# Patient Record
Sex: FEMALE | Race: WHITE | Employment: FULL TIME | URBAN - METROPOLITAN AREA
[De-identification: names, ages, dates, MRNs, and addresses within clinical notes are randomized per-mention and may not be internally consistent; named-entity substitution may affect disease eponyms.]

---

## 2021-12-23 ENCOUNTER — DOCUMENTATION ONLY (OUTPATIENT)
Dept: WOUND CARE | Age: 41
End: 2021-12-23

## 2021-12-23 NOTE — PROGRESS NOTES
LVM for patient to call [facility] to [facility] new patient appointment for wound care.  Refer'd by Dr. Shiirn Kyle from [address] [facility]

## 2022-01-24 ENCOUNTER — HOSPITAL ENCOUNTER (OUTPATIENT)
Dept: WOUND CARE | Age: 42
Discharge: HOME | End: 2022-01-24

## 2022-01-24 NOTE — DISCHARGE INSTRUCTIONS
Discharge Instructions for  [facility]  [address] [place], [address] [facility] [address]  Telephone: [phone] [address] [facility] Information: Should you experience any significant changes in your wound(s) or have questions about your wound care, please contact the [facility] Main at [address] [place] [address] Street 8:00 am - 4:30. If you need help with your wound outside these hours and cannot wait until we are again available, contact your PCP or go to the hospital emergency room. NAME:  Tai Figueroa  YOB: 1980  DATE:  1/24/2022    : Braden Medel     [] Wound and dressing supply provider: {DME provider:34427}  [] Home Healthcare: {Home Health :42190}    Wound Cleansing:   Do not scrub or use excessive force. Cleanse wound prior to applying a clean dressing with:  [] Normal Saline   [] Keep Wound Dry in Shower - may purchase a cast cover at local pharmacy     [] Cleanse wound with Mild Soap & Water    [] May Shower at Discharge: remove dressing 1st, redress wound right after with a new dressing  [] Do not shower  [] cleanse with baby shampoo lather leave 2-3 then rinse with water    Topical Treatments:  Do not apply lotions, creams, or ointments to wound bed unless directed.    [] Apply moisturizing lotion {kimble lotions :43364} to skin surrounding the wound prior to dressing change.  [] Bactroban/Mupirocin  [] Gentamicin ointment    [] Gentian violet to wound bed and periwound  [] Other:     Dressings:                   Wound Location ***     Apply Primary Dressing:      []        Pack wound loosely with:                                                   [] Iodoform       [] Plain Packing       [] Mesalt        [] Other:    Cover and Secure with:  [] Gauze [] ABD [] exudry     [] Betito [] Kerlix [] Mepilex Border  [] Ace Wrap [] Roll Tape   [] Other:      Change dressing:   [] Daily      [] Every Other Day   [] Three times per week  [] Once a week   [] Do Not Change Dressing     [] Other:    Pressure Relief:  [] When sitting, shift position or do seat lifts every 15 minutes.  [] Wheelchair cushion   [] Specialty Bed/Mattress  [] Turn every 2 hours when in bed. Avoid position directing pressure on wound site. Off-Loading:   [] Off-loading when [] walking  [] in bed [] sitting     Edema Control: Every morning immediately when getting up should be applied to affected leg(s) from mid foot to knee making sure to cover the heel. Remove every night before going to bed if desired. Apply: [] Compression Stocking      []Right Leg           []Left Leg   [] Tubigrip {tubigrip:05615}   [] Right Leg Single Layer  [] Right Leg Double Layer                              [] Left Leg Single Layer [] Left Leg Double Layer      Compression: Do not get leg(s) with wrap wet. If wraps become too tight call the center or completely remove the wrap. Apply: [] Three Layer Compression Wrap  []RightLeg []Left Leg  [] Four layer Compression Wrap      []RightLeg []Left Leg   []  Unna's boot                                  [] Right Leg   [] Left Leg       [] Elevate leg(s) above the level of the heart when sitting. [] Avoid prolonged standing in one place. Dietary:  [] Diet as tolerated [] Diabetic Diet   [] Increase Protein: examples (Meat, cheese, eggs, greek yogurt, fish, nuts)   [] Elpidio Therapeutic Nutrition Powder  [] Other:  [] Dial a Dietician : Call [facility] at [phone] enter code [phone]) when prompted. M-F 9am-5pm EST. Return Appointment:  [] Nurse Visit at wound center in *** days   [] Return Appointment: With {lamin SPARKS list :98971}  [] Ordered tests:     Electronically signed on 1/24/2022 at 7:54 AM     PLEASE NOTE: IF YOU ARE UNABLE TO [address], CONTINUE TO USE  [address] UNTIL YOU ARE ABLE TO [facility]. IT IS MOST IMPORTANT TO KEEP THE WOUND COVERED AT ALL TIMES.      Physician Signature:_______________________  Dr. Chari Nunes

## 2022-05-19 ENCOUNTER — TRANSCRIBE ORDER (OUTPATIENT)
Dept: SCHEDULING | Age: 42
End: 2022-05-19

## 2022-05-19 DIAGNOSIS — Z13.820 SCREENING FOR OSTEOPOROSIS: Primary | ICD-10-CM

## 2022-06-03 ENCOUNTER — HOSPITAL ENCOUNTER (OUTPATIENT)
Dept: MAMMOGRAPHY | Age: 42
Discharge: HOME | End: 2022-06-03
Attending: INTERNAL MEDICINE
Payer: COMMERCIAL

## 2022-06-03 DIAGNOSIS — Z13.820 SCREENING FOR OSTEOPOROSIS: ICD-10-CM

## 2022-06-03 PROCEDURE — 77080 DXA BONE DENSITY AXIAL: CPT

## 2023-11-02 ENCOUNTER — APPOINTMENT (OUTPATIENT)
Facility: HOSPITAL | Age: 43
DRG: 690 | End: 2023-11-02

## 2023-11-02 ENCOUNTER — HOSPITAL ENCOUNTER (INPATIENT)
Facility: HOSPITAL | Age: 43
LOS: 4 days | Discharge: HOME | DRG: 690 | End: 2023-11-06
Attending: STUDENT IN AN ORGANIZED HEALTH CARE EDUCATION/TRAINING PROGRAM | Admitting: INTERNAL MEDICINE

## 2023-11-02 DIAGNOSIS — A41.9 SEVERE SEPSIS (HCC): ICD-10-CM

## 2023-11-02 DIAGNOSIS — R65.20 SEVERE SEPSIS (HCC): ICD-10-CM

## 2023-11-02 DIAGNOSIS — R50.81 FEVER IN OTHER DISEASES: Primary | ICD-10-CM

## 2023-11-02 LAB
ALBUMIN SERPL-MCNC: 2.7 G/DL (ref 3.5–5)
ALBUMIN/GLOB SERPL: 0.6 (ref 1.1–2.2)
ALP SERPL-CCNC: 75 U/L (ref 45–117)
ALT SERPL-CCNC: 31 U/L (ref 12–78)
ANION GAP SERPL CALC-SCNC: 10 MMOL/L (ref 5–15)
APPEARANCE UR: CLEAR
AST SERPL-CCNC: 40 U/L (ref 15–37)
B PERT DNA SPEC QL NAA+PROBE: NOT DETECTED
BACTERIA URNS QL MICRO: ABNORMAL /HPF
BASOPHILS # BLD: 0 K/UL (ref 0–0.1)
BASOPHILS NFR BLD: 0 % (ref 0–1)
BILIRUB SERPL-MCNC: 0.3 MG/DL (ref 0.2–1)
BILIRUB UR QL: NEGATIVE
BORDETELLA PARAPERTUSSIS BY PCR: NOT DETECTED
BUN SERPL-MCNC: 9 MG/DL (ref 6–20)
BUN/CREAT SERPL: 7 (ref 12–20)
C PNEUM DNA SPEC QL NAA+PROBE: NOT DETECTED
CALCIUM SERPL-MCNC: 9 MG/DL (ref 8.5–10.1)
CHLORIDE SERPL-SCNC: 96 MMOL/L (ref 97–108)
CO2 SERPL-SCNC: 26 MMOL/L (ref 21–32)
COLOR UR: ABNORMAL
COMMENT:: NORMAL
CREAT SERPL-MCNC: 1.21 MG/DL (ref 0.55–1.02)
DIFFERENTIAL METHOD BLD: ABNORMAL
EOSINOPHIL # BLD: 0 K/UL (ref 0–0.4)
EOSINOPHIL NFR BLD: 0 % (ref 0–7)
EPITH CASTS URNS QL MICRO: ABNORMAL /LPF
ERYTHROCYTE [DISTWIDTH] IN BLOOD BY AUTOMATED COUNT: 13.4 % (ref 11.5–14.5)
FLUAV SUBTYP SPEC NAA+PROBE: NOT DETECTED
FLUBV RNA SPEC QL NAA+PROBE: NOT DETECTED
GLOBULIN SER CALC-MCNC: 4.8 G/DL (ref 2–4)
GLUCOSE BLD STRIP.AUTO-MCNC: 202 MG/DL (ref 65–117)
GLUCOSE SERPL-MCNC: 476 MG/DL (ref 65–100)
GLUCOSE UR STRIP.AUTO-MCNC: 250 MG/DL
HADV DNA SPEC QL NAA+PROBE: NOT DETECTED
HCG UR QL: NEGATIVE
HCOV 229E RNA SPEC QL NAA+PROBE: NOT DETECTED
HCOV HKU1 RNA SPEC QL NAA+PROBE: NOT DETECTED
HCOV NL63 RNA SPEC QL NAA+PROBE: NOT DETECTED
HCOV OC43 RNA SPEC QL NAA+PROBE: NOT DETECTED
HCT VFR BLD AUTO: 43.2 % (ref 35–47)
HGB BLD-MCNC: 14.9 G/DL (ref 11.5–16)
HGB UR QL STRIP: ABNORMAL
HMPV RNA SPEC QL NAA+PROBE: NOT DETECTED
HPIV1 RNA SPEC QL NAA+PROBE: NOT DETECTED
HPIV2 RNA SPEC QL NAA+PROBE: NOT DETECTED
HPIV3 RNA SPEC QL NAA+PROBE: NOT DETECTED
HPIV4 RNA SPEC QL NAA+PROBE: NOT DETECTED
IMM GRANULOCYTES # BLD AUTO: 0.1 K/UL (ref 0–0.04)
IMM GRANULOCYTES NFR BLD AUTO: 1 % (ref 0–0.5)
KETONES UR QL STRIP.AUTO: NEGATIVE MG/DL
LACTATE BLD-SCNC: 4.48 MMOL/L (ref 0.4–2)
LACTATE BLD-SCNC: 5.72 MMOL/L (ref 0.4–2)
LEUKOCYTE ESTERASE UR QL STRIP.AUTO: NEGATIVE
LIPASE SERPL-CCNC: 24 U/L (ref 13–75)
LYMPHOCYTES # BLD: 0.9 K/UL (ref 0.8–3.5)
LYMPHOCYTES NFR BLD: 9 % (ref 12–49)
M PNEUMO DNA SPEC QL NAA+PROBE: NOT DETECTED
MCH RBC QN AUTO: 31.3 PG (ref 26–34)
MCHC RBC AUTO-ENTMCNC: 34.5 G/DL (ref 30–36.5)
MCV RBC AUTO: 90.8 FL (ref 80–99)
MONOCYTES # BLD: 0.5 K/UL (ref 0–1)
MONOCYTES NFR BLD: 5 % (ref 5–13)
NEUTS SEG # BLD: 8.9 K/UL (ref 1.8–8)
NEUTS SEG NFR BLD: 85 % (ref 32–75)
NITRITE UR QL STRIP.AUTO: NEGATIVE
NRBC # BLD: 0 K/UL (ref 0–0.01)
NRBC BLD-RTO: 0 PER 100 WBC
PH UR STRIP: 6 (ref 5–8)
PLATELET # BLD AUTO: 205 K/UL (ref 150–400)
PMV BLD AUTO: 10.2 FL (ref 8.9–12.9)
POTASSIUM SERPL-SCNC: 4.2 MMOL/L (ref 3.5–5.1)
PROCALCITONIN SERPL-MCNC: 0.51 NG/ML
PROT SERPL-MCNC: 7.5 G/DL (ref 6.4–8.2)
PROT UR STRIP-MCNC: NEGATIVE MG/DL
RBC # BLD AUTO: 4.76 M/UL (ref 3.8–5.2)
RBC #/AREA URNS HPF: ABNORMAL /HPF (ref 0–5)
RSV RNA SPEC QL NAA+PROBE: NOT DETECTED
RV+EV RNA SPEC QL NAA+PROBE: NOT DETECTED
SARS-COV-2 RNA RESP QL NAA+PROBE: NOT DETECTED
SERVICE CMNT-IMP: ABNORMAL
SODIUM SERPL-SCNC: 132 MMOL/L (ref 136–145)
SP GR UR REFRACTOMETRY: >1.03 (ref 1–1.03)
SPECIMEN HOLD: NORMAL
TROPONIN I SERPL HS-MCNC: 6 NG/L (ref 0–51)
URINE CULTURE IF INDICATED: ABNORMAL
UROBILINOGEN UR QL STRIP.AUTO: 0.2 EU/DL (ref 0.2–1)
WBC # BLD AUTO: 10.4 K/UL (ref 3.6–11)
WBC URNS QL MICRO: ABNORMAL /HPF (ref 0–4)
YEAST URNS QL MICRO: PRESENT

## 2023-11-02 PROCEDURE — 87154 CUL TYP ID BLD PTHGN 6+ TRGT: CPT

## 2023-11-02 PROCEDURE — 87077 CULTURE AEROBIC IDENTIFY: CPT

## 2023-11-02 PROCEDURE — 6370000000 HC RX 637 (ALT 250 FOR IP): Performed by: STUDENT IN AN ORGANIZED HEALTH CARE EDUCATION/TRAINING PROGRAM

## 2023-11-02 PROCEDURE — 2580000003 HC RX 258: Performed by: INTERNAL MEDICINE

## 2023-11-02 PROCEDURE — 6360000002 HC RX W HCPCS: Performed by: EMERGENCY MEDICINE

## 2023-11-02 PROCEDURE — 93005 ELECTROCARDIOGRAM TRACING: CPT | Performed by: EMERGENCY MEDICINE

## 2023-11-02 PROCEDURE — 2580000003 HC RX 258: Performed by: EMERGENCY MEDICINE

## 2023-11-02 PROCEDURE — 87040 BLOOD CULTURE FOR BACTERIA: CPT

## 2023-11-02 PROCEDURE — 83605 ASSAY OF LACTIC ACID: CPT

## 2023-11-02 PROCEDURE — 81025 URINE PREGNANCY TEST: CPT

## 2023-11-02 PROCEDURE — 80053 COMPREHEN METABOLIC PANEL: CPT

## 2023-11-02 PROCEDURE — 85025 COMPLETE CBC W/AUTO DIFF WBC: CPT

## 2023-11-02 PROCEDURE — 74177 CT ABD & PELVIS W/CONTRAST: CPT

## 2023-11-02 PROCEDURE — 6360000004 HC RX CONTRAST MEDICATION: Performed by: STUDENT IN AN ORGANIZED HEALTH CARE EDUCATION/TRAINING PROGRAM

## 2023-11-02 PROCEDURE — 36415 COLL VENOUS BLD VENIPUNCTURE: CPT

## 2023-11-02 PROCEDURE — 6360000002 HC RX W HCPCS: Performed by: INTERNAL MEDICINE

## 2023-11-02 PROCEDURE — 2580000003 HC RX 258: Performed by: STUDENT IN AN ORGANIZED HEALTH CARE EDUCATION/TRAINING PROGRAM

## 2023-11-02 PROCEDURE — 94761 N-INVAS EAR/PLS OXIMETRY MLT: CPT

## 2023-11-02 PROCEDURE — 1100000000 HC RM PRIVATE

## 2023-11-02 PROCEDURE — 84145 PROCALCITONIN (PCT): CPT

## 2023-11-02 PROCEDURE — 81001 URINALYSIS AUTO W/SCOPE: CPT

## 2023-11-02 PROCEDURE — 82962 GLUCOSE BLOOD TEST: CPT

## 2023-11-02 PROCEDURE — 0202U NFCT DS 22 TRGT SARS-COV-2: CPT

## 2023-11-02 PROCEDURE — 84484 ASSAY OF TROPONIN QUANT: CPT

## 2023-11-02 PROCEDURE — 87186 SC STD MICRODIL/AGAR DIL: CPT

## 2023-11-02 PROCEDURE — 99285 EMERGENCY DEPT VISIT HI MDM: CPT

## 2023-11-02 PROCEDURE — 96374 THER/PROPH/DIAG INJ IV PUSH: CPT

## 2023-11-02 PROCEDURE — 71046 X-RAY EXAM CHEST 2 VIEWS: CPT

## 2023-11-02 PROCEDURE — 83690 ASSAY OF LIPASE: CPT

## 2023-11-02 RX ORDER — SODIUM CHLORIDE 9 MG/ML
INJECTION, SOLUTION INTRAVENOUS CONTINUOUS
Status: DISPENSED | OUTPATIENT
Start: 2023-11-02 | End: 2023-11-04

## 2023-11-02 RX ORDER — POTASSIUM CHLORIDE 7.45 MG/ML
10 INJECTION INTRAVENOUS PRN
Status: DISCONTINUED | OUTPATIENT
Start: 2023-11-02 | End: 2023-11-06 | Stop reason: HOSPADM

## 2023-11-02 RX ORDER — ONDANSETRON 2 MG/ML
4 INJECTION INTRAMUSCULAR; INTRAVENOUS EVERY 6 HOURS PRN
Status: DISCONTINUED | OUTPATIENT
Start: 2023-11-02 | End: 2023-11-06 | Stop reason: HOSPADM

## 2023-11-02 RX ORDER — INSULIN LISPRO 100 [IU]/ML
0-16 INJECTION, SOLUTION INTRAVENOUS; SUBCUTANEOUS
Status: DISCONTINUED | OUTPATIENT
Start: 2023-11-03 | End: 2023-11-03

## 2023-11-02 RX ORDER — INSULIN LISPRO 100 [IU]/ML
0-4 INJECTION, SOLUTION INTRAVENOUS; SUBCUTANEOUS NIGHTLY
Status: DISCONTINUED | OUTPATIENT
Start: 2023-11-02 | End: 2023-11-03

## 2023-11-02 RX ORDER — POTASSIUM CHLORIDE 750 MG/1
40 TABLET, FILM COATED, EXTENDED RELEASE ORAL PRN
Status: DISCONTINUED | OUTPATIENT
Start: 2023-11-02 | End: 2023-11-06 | Stop reason: HOSPADM

## 2023-11-02 RX ORDER — SODIUM CHLORIDE 0.9 % (FLUSH) 0.9 %
5-40 SYRINGE (ML) INJECTION PRN
Status: DISCONTINUED | OUTPATIENT
Start: 2023-11-02 | End: 2023-11-06 | Stop reason: HOSPADM

## 2023-11-02 RX ORDER — 0.9 % SODIUM CHLORIDE 0.9 %
2000 INTRAVENOUS SOLUTION INTRAVENOUS ONCE
Status: COMPLETED | OUTPATIENT
Start: 2023-11-02 | End: 2023-11-02

## 2023-11-02 RX ORDER — POLYETHYLENE GLYCOL 3350 17 G/17G
17 POWDER, FOR SOLUTION ORAL DAILY PRN
Status: DISCONTINUED | OUTPATIENT
Start: 2023-11-02 | End: 2023-11-06 | Stop reason: HOSPADM

## 2023-11-02 RX ORDER — ACETAMINOPHEN 500 MG
1000 TABLET ORAL
Status: COMPLETED | OUTPATIENT
Start: 2023-11-02 | End: 2023-11-02

## 2023-11-02 RX ORDER — 0.9 % SODIUM CHLORIDE 0.9 %
1000 INTRAVENOUS SOLUTION INTRAVENOUS
Status: COMPLETED | OUTPATIENT
Start: 2023-11-02 | End: 2023-11-02

## 2023-11-02 RX ORDER — ACETAMINOPHEN 325 MG/1
650 TABLET ORAL EVERY 6 HOURS PRN
Status: DISCONTINUED | OUTPATIENT
Start: 2023-11-02 | End: 2023-11-06 | Stop reason: HOSPADM

## 2023-11-02 RX ORDER — SODIUM CHLORIDE 9 MG/ML
INJECTION, SOLUTION INTRAVENOUS PRN
Status: DISCONTINUED | OUTPATIENT
Start: 2023-11-02 | End: 2023-11-06 | Stop reason: HOSPADM

## 2023-11-02 RX ORDER — 0.9 % SODIUM CHLORIDE 0.9 %
1000 INTRAVENOUS SOLUTION INTRAVENOUS ONCE
Status: COMPLETED | OUTPATIENT
Start: 2023-11-02 | End: 2023-11-02

## 2023-11-02 RX ORDER — MAGNESIUM SULFATE IN WATER 40 MG/ML
2000 INJECTION, SOLUTION INTRAVENOUS PRN
Status: DISCONTINUED | OUTPATIENT
Start: 2023-11-02 | End: 2023-11-06 | Stop reason: HOSPADM

## 2023-11-02 RX ORDER — ACETAMINOPHEN 650 MG/1
650 SUPPOSITORY RECTAL EVERY 6 HOURS PRN
Status: DISCONTINUED | OUTPATIENT
Start: 2023-11-02 | End: 2023-11-06 | Stop reason: HOSPADM

## 2023-11-02 RX ORDER — ENOXAPARIN SODIUM 100 MG/ML
30 INJECTION SUBCUTANEOUS 2 TIMES DAILY
Status: DISCONTINUED | OUTPATIENT
Start: 2023-11-02 | End: 2023-11-06 | Stop reason: HOSPADM

## 2023-11-02 RX ORDER — SODIUM CHLORIDE 0.9 % (FLUSH) 0.9 %
5-40 SYRINGE (ML) INJECTION EVERY 12 HOURS SCHEDULED
Status: DISCONTINUED | OUTPATIENT
Start: 2023-11-02 | End: 2023-11-06 | Stop reason: HOSPADM

## 2023-11-02 RX ORDER — ONDANSETRON 4 MG/1
4 TABLET, ORALLY DISINTEGRATING ORAL EVERY 8 HOURS PRN
Status: DISCONTINUED | OUTPATIENT
Start: 2023-11-02 | End: 2023-11-06 | Stop reason: HOSPADM

## 2023-11-02 RX ADMIN — SODIUM CHLORIDE 1000 ML: 9 INJECTION, SOLUTION INTRAVENOUS at 14:19

## 2023-11-02 RX ADMIN — IOPAMIDOL 100 ML: 755 INJECTION, SOLUTION INTRAVENOUS at 16:08

## 2023-11-02 RX ADMIN — CEFEPIME 2000 MG: 2 INJECTION, POWDER, FOR SOLUTION INTRAVENOUS at 22:27

## 2023-11-02 RX ADMIN — ACETAMINOPHEN 1000 MG: 500 TABLET ORAL at 14:19

## 2023-11-02 RX ADMIN — CEFEPIME 2000 MG: 2 INJECTION, POWDER, FOR SOLUTION INTRAVENOUS at 16:50

## 2023-11-02 RX ADMIN — ENOXAPARIN SODIUM 30 MG: 100 INJECTION SUBCUTANEOUS at 20:54

## 2023-11-02 RX ADMIN — VANCOMYCIN HYDROCHLORIDE 1250 MG: 1.25 INJECTION, POWDER, LYOPHILIZED, FOR SOLUTION INTRAVENOUS at 17:32

## 2023-11-02 RX ADMIN — VANCOMYCIN HYDROCHLORIDE 1250 MG: 1.25 INJECTION, POWDER, LYOPHILIZED, FOR SOLUTION INTRAVENOUS at 17:31

## 2023-11-02 RX ADMIN — ONDANSETRON 4 MG: 2 INJECTION INTRAMUSCULAR; INTRAVENOUS at 21:32

## 2023-11-02 RX ADMIN — SODIUM CHLORIDE 1000 ML: 9 INJECTION, SOLUTION INTRAVENOUS at 15:09

## 2023-11-02 RX ADMIN — SODIUM CHLORIDE: 9 INJECTION, SOLUTION INTRAVENOUS at 21:28

## 2023-11-02 RX ADMIN — SODIUM CHLORIDE 2000 ML: 9 INJECTION, SOLUTION INTRAVENOUS at 16:55

## 2023-11-02 RX ADMIN — SODIUM CHLORIDE, PRESERVATIVE FREE 10 ML: 5 INJECTION INTRAVENOUS at 20:55

## 2023-11-02 ASSESSMENT — PAIN - FUNCTIONAL ASSESSMENT: PAIN_FUNCTIONAL_ASSESSMENT: 0-10

## 2023-11-02 ASSESSMENT — PAIN DESCRIPTION - LOCATION: LOCATION: BACK

## 2023-11-02 ASSESSMENT — PAIN SCALES - GENERAL: PAINLEVEL_OUTOF10: 3

## 2023-11-02 ASSESSMENT — PAIN DESCRIPTION - DESCRIPTORS: DESCRIPTORS: ACHING

## 2023-11-02 ASSESSMENT — PAIN DESCRIPTION - PAIN TYPE: TYPE: CHRONIC PAIN

## 2023-11-02 NOTE — ED TRIAGE NOTES
Ambulatory to treatment area in no distress, reports feeling sick yesterday morning dizzy with hot/cold flashes, vomited x one last night also reports intermittent burning with urination and urgency.

## 2023-11-02 NOTE — ED NOTES
Report called to [facility] at [address] [facility] ED. Receiving RN advised that Vancomycin is infusing during transport and 2nd liter of NS bolus to be started upon arrival to ED to complete 2L bolus order started at 1655. [facility] staff also informed of current infusions.       Tye Pineda RN  11/02/23 8056

## 2023-11-02 NOTE — PROGRESS NOTES
Report received    2128  IVF hung     2227  Pt IV antibiotic hung     0009  Pt given tylenol 650 mg for complaint of headache     0320  No changes noted     0418  Labs drawn without difficulty     0619  Rounds made pt resting quietly with eyes closed     0726  Report given to Charlotte Prado.  Including SBAR

## 2023-11-02 NOTE — ED PROVIDER NOTES
3:30 PM  Change of shift. Care of patient taken over from Dr. Riddle Child; H&P reviewed,  handoff complete. Awaiting repeat lactic acid. Melia Myers MD    Perfect Serve Consult for Admission  4:37 PM    ED Room Number: WER07/07  Patient Name and age:  Maris Allen 44 y.o.  female  Working Diagnosis:   1. Fever in other diseases    Severe sepsis    COVID-19 Suspicion: No  Sepsis present:  Yes  Reassessment needed: Yes  Code Status:  Full Code  Readmission: No  Isolation Requirements: no  Recommended Level of Care: telemetry  Department: [facility] ED - [phone]  1 day history of \"hot flashes,\" shaking chills. She has had recent urinary \"burning. \"  Heart rate has been persistently elevated as high as in the 140s. Normal white blood cell count with left shift. Initial lactate 5.72. Repeat lactate 4.5 after 2 L of fluids. Cefepime and vancomycin. No source of f infection found. Total critical care time spent exclusive of procedures:  35 minutes. Code Sepsis Reassessment & Plan   Sepsis order set entered: Yes  Broad Spectrum Antibiotics given: Cefepime and Vancomycin  Repeat lactic acid ordered for time already done  Re-assessment performed at time 1630 and clinical condition stable. Actions taken: More fluids/antibiotics. Hypotension or Lactic Acidosis present (SBP<90, MAP<65, Lactate >4): Yes  IVF: 30 cc/kg actual Body Weight  Persistent Septic Shock present (Hypotension despite IVF resuscitation): No Vasopressors: Not indicated as septic shock is not present  Disposition: Admit to Telemetry       Javan Monsalve MD  11/02/23 6350    EKG: Sinus tachycardia; rate of 128; nonspecific ST, T wave abnormalities. Interpreted by me.  Michael Higginbotham MD    Consult note: I reached out to the hospitalist service via [employer] for admission.   Melia Myers MD  4:58 PM       Javan Monsalve MD  11/02/23 2967

## 2023-11-02 NOTE — ED PROVIDER NOTES
[facility] EMERGENCY DEPT  EMERGENCY DEPARTMENT ENCOUNTER      Pt Name: Dolly Villanueva  MRN: [id number]  [address] [facility] [place] 1980  Date of evaluation: 11/2/2023  Provider: Blane Harrell       Chief Complaint   Patient presents with    urinary s/sx         HISTORY OF PRESENT ILLNESS    HPI    Dolly Villanueva is a 44 y.o. female with a history of hypertension and diabetes who presents to the emergency department for evaluation of feeling unwell. Patient notes yesterday morning waking up feeling generally unwell, states she was lightheaded, having hot and cold flashes. She had nausea with 1 episode of emesis yesterday. Also experiencing diarrhea. She has had intermittent dysuria over the last month but notes over the last several days having increasing frequency. No known fevers. No cough or congestion. Denies other recent illness. Nursing Notes were reviewed. REVIEW OF SYSTEMS       Review of Systems        PAST MEDICAL HISTORY     Past Medical History:   Diagnosis Date    Diabetes ([address])     Hypertension          SURGICAL HISTORY       Past Surgical History:   Procedure Laterality Date    TONSILLECTOMY AND ADENOIDECTOMY           CURRENT MEDICATIONS       Previous Medications    LORCASERIN HCL 10 MG TABS    Take 10 mg by mouth 2 times daily.     METOPROLOL TARTRATE (LOPRESSOR) 50 MG TABLET    Take 50 mg by mouth 2 times daily    SPIRONOLACTONE (ALDACTONE) 25 MG TABLET    Take by mouth daily       ALLERGIES     Augmentin [amoxicillin-pot clavulanate], Codeine, Levaquin [levofloxacin], and Sulfa antibiotics    FAMILY HISTORY       Family History   Problem Relation Age of Onset    Heart Disease Father     Diabetes Father           SOCIAL HISTORY       Social History     Socioeconomic History    Marital status:    Tobacco Use    Smoking status: Never   Substance and Sexual Activity    Alcohol use: Yes           PHYSICAL EXAM       ED Triage Vitals [11/02/23 1348]   BP Temp Temp

## 2023-11-02 NOTE — ED NOTES
Verbal report given to [address] Zander Ferguson Ana RN(name) on [facility] being transferred to [facility] ED (unit) for routine progression of patient care    Report consisted of patient's Situation, Background, Assessment and Recommendations (SBAR)    Information from the following report(s)  Nurse Handoff Report, ED Encounter Report, ED SBAR, Adult Overview, Intake/Output, MAR, Recent Results, Med Rec Status, Cardiac Rhythm  , and Neuro Assessment was reviewed with the receiving nurse. Opportunity for questions and clarification was provided. Patient transported with:  Monitor    Last Filed Values:  Vitals:    11/02/23 1845   BP: 139/62   Pulse: (!) 122   Resp:    Temp:    SpO2: 96%          WBC   Date Value Ref Range Status   11/02/2023 10.4 3.6 - 11.0 K/uL Final        Blood Cultures Drawn:   Yes     Initial Lactic Acid (LA):  Time 1409,  Result 5.72    Repeat LA:  Time Due at completion of 2nd NS bolus, Done & Result 4.48    Fluid Restriction:  Total needed 4 L, Status infusing    All Antibiotics Started:  Yes, Dose Due 1650    VS x 2 post-fluid resuscitation:   Yes    Vasopressor Infusion:  No     Provider Reassessment needed and notified:  No, Due     Additional Interventions/Comments:  pt transported to [facility] ED with vancomycin and NS bolus infusing.        Jeff Garces RN  11/02/23 8198

## 2023-11-02 NOTE — ED NOTES
TRANSFER - OUT REPORT:    [facility] on Dolly Vill[facility]ueva  being transferred to [facility] ED for routine progression of patient care       Report consisted of patient's Situation, Background, Assessment and   Recommendations(SBAR). Information from the following report(s) Nurse Handoff Report, ED Encounter Summary, ED SBAR, Adult Overview, Surgery Report, Intake/Output, MAR, Recent Results, Med Rec Status, Cardiac Rhythm  , and Neuro Assessment was reviewed with the receiving nurse. [place] Assessment: Presents to emergency department  because of falls (Syncope, seizure, or loss of consciousness): No, Age > 79: No, Altered Mental Status, Intoxication with alcohol or substance confusion (Disorientation, impaired judgment, poor safety awaremess, or inability to follow instructions): No, Impaired Mobility: Ambulates or transfers with assistive devices or assistance; Unable to ambulate or transer.: No, Nursing Judgement: Yes  Lines:   Peripheral IV 11/02/23 Left Antecubital (Active)        Opportunity for questions and clarification was provided.       Patient transported with:  Monitor, [address] [facility] staff, saline lock X 2, IV vancomycin, IV fluids          Ame Bailey RN  11/02/23 0356

## 2023-11-03 PROBLEM — E11.65 POORLY CONTROLLED DIABETES MELLITUS (HCC): Status: ACTIVE | Noted: 2023-11-03

## 2023-11-03 PROBLEM — E88.810 INSULIN RESISTANCE SYNDROME: Status: ACTIVE | Noted: 2023-11-03

## 2023-11-03 PROBLEM — R50.9 FEVER: Status: ACTIVE | Noted: 2023-11-03

## 2023-11-03 LAB
ACCESSION NUMBER, LLC1M: ABNORMAL
ACINETOBACTER CALCOAC BAUMANNII COMPLEX BY PCR: NOT DETECTED
ANION GAP SERPL CALC-SCNC: 8 MMOL/L (ref 5–15)
B FRAGILIS DNA BLD POS QL NAA+NON-PROBE: NOT DETECTED
BIOFIRE TEST COMMENT: ABNORMAL
BLACTX-M ISLT/SPM QL: NOT DETECTED
BLAIMP ISLT/SPM QL: NOT DETECTED
BLAKPC BLD POS QL NAA+NON-PROBE: NOT DETECTED
BLAOXA-48-LIKE ISLT/SPM QL: NOT DETECTED
BLAVIM ISLT/SPM QL: NOT DETECTED
BUN SERPL-MCNC: 8 MG/DL (ref 6–20)
BUN/CREAT SERPL: 11 (ref 12–20)
C ALBICANS DNA BLD POS QL NAA+NON-PROBE: NOT DETECTED
C AURIS DNA BLD POS QL NAA+NON-PROBE: NOT DETECTED
C GATTII+NEOFOR DNA BLD POS QL NAA+N-PRB: NOT DETECTED
C GLABRATA DNA BLD POS QL NAA+NON-PROBE: NOT DETECTED
C KRUSEI DNA BLD POS QL NAA+NON-PROBE: NOT DETECTED
C PARAP DNA BLD POS QL NAA+NON-PROBE: NOT DETECTED
C TROPICLS DNA BLD POS QL NAA+NON-PROBE: NOT DETECTED
CALCIUM SERPL-MCNC: 7.8 MG/DL (ref 8.5–10.1)
CHLORIDE SERPL-SCNC: 106 MMOL/L (ref 97–108)
CO2 SERPL-SCNC: 21 MMOL/L (ref 21–32)
COLISTIN RES MCR-1 ISLT/SPM QL: NOT DETECTED
COMMENT:: NORMAL
COMMENT:: NORMAL
CREAT SERPL-MCNC: 0.65 MG/DL (ref 0.55–1.02)
CREAT SERPL-MCNC: 0.75 MG/DL (ref 0.55–1.02)
E CLOAC COMP DNA BLD POS NAA+NON-PROBE: NOT DETECTED
E COLI DNA BLD POS QL NAA+NON-PROBE: DETECTED
E FAECALIS DNA BLD POS QL NAA+NON-PROBE: NOT DETECTED
E FAECIUM DNA BLD POS QL NAA+NON-PROBE: NOT DETECTED
EKG ATRIAL RATE: 128 BPM
EKG DIAGNOSIS: NORMAL
EKG P-R INTERVAL: 104 MS
EKG Q-T INTERVAL: 402 MS
EKG QRS DURATION: 84 MS
EKG QTC CALCULATION (BAZETT): 586 MS
EKG R AXIS: -5 DEGREES
EKG T AXIS: 36 DEGREES
EKG VENTRICULAR RATE: 128 BPM
ENTEROBACTERALES DNA BLD POS NAA+N-PRB: DETECTED
ERYTHROCYTE [DISTWIDTH] IN BLOOD BY AUTOMATED COUNT: 13.5 % (ref 11.5–14.5)
EST. AVERAGE GLUCOSE BLD GHB EST-MCNC: 258 MG/DL
GLUCOSE BLD STRIP.AUTO-MCNC: 170 MG/DL (ref 65–117)
GLUCOSE BLD STRIP.AUTO-MCNC: 177 MG/DL (ref 65–117)
GLUCOSE BLD STRIP.AUTO-MCNC: 210 MG/DL (ref 65–117)
GLUCOSE BLD STRIP.AUTO-MCNC: 258 MG/DL (ref 65–117)
GLUCOSE BLD STRIP.AUTO-MCNC: 263 MG/DL (ref 65–117)
GLUCOSE SERPL-MCNC: 284 MG/DL (ref 65–100)
GP B STREP DNA BLD POS QL NAA+NON-PROBE: NOT DETECTED
HAEM INFLU DNA BLD POS QL NAA+NON-PROBE: NOT DETECTED
HBA1C MFR BLD: 10.6 % (ref 4–5.6)
HCT VFR BLD AUTO: 39.4 % (ref 35–47)
HGB BLD-MCNC: 12.8 G/DL (ref 11.5–16)
K OXYTOCA DNA BLD POS QL NAA+NON-PROBE: NOT DETECTED
KLEBSIELLA SP DNA BLD POS QL NAA+NON-PRB: NOT DETECTED
KLEBSIELLA SP DNA BLD POS QL NAA+NON-PRB: NOT DETECTED
L MONOCYTOG DNA BLD POS QL NAA+NON-PROBE: NOT DETECTED
LACTATE SERPL-SCNC: 1.5 MMOL/L (ref 0.4–2)
MCH RBC QN AUTO: 29.4 PG (ref 26–34)
MCHC RBC AUTO-ENTMCNC: 32.5 G/DL (ref 30–36.5)
MCV RBC AUTO: 90.6 FL (ref 80–99)
N MEN DNA BLD POS QL NAA+NON-PROBE: NOT DETECTED
NRBC # BLD: 0 K/UL (ref 0–0.01)
NRBC BLD-RTO: 0 PER 100 WBC
P AERUGINOSA DNA BLD POS NAA+NON-PROBE: NOT DETECTED
PLATELET # BLD AUTO: 171 K/UL (ref 150–400)
PMV BLD AUTO: 10.2 FL (ref 8.9–12.9)
POTASSIUM SERPL-SCNC: 3.8 MMOL/L (ref 3.5–5.1)
PROTEUS SP DNA BLD POS QL NAA+NON-PROBE: NOT DETECTED
RBC # BLD AUTO: 4.35 M/UL (ref 3.8–5.2)
RESISTANT GENE NDM BY PCR: NOT DETECTED
RESISTANT GENE TARGETS: ABNORMAL
S AUREUS DNA BLD POS QL NAA+NON-PROBE: NOT DETECTED
S AUREUS+CONS DNA BLD POS NAA+NON-PROBE: NOT DETECTED
S EPIDERMIDIS DNA BLD POS QL NAA+NON-PRB: NOT DETECTED
S LUGDUNENSIS DNA BLD POS QL NAA+NON-PRB: NOT DETECTED
S MALTOPHILIA DNA BLD POS QL NAA+NON-PRB: NOT DETECTED
S MARCESCENS DNA BLD POS NAA+NON-PROBE: NOT DETECTED
S PNEUM DNA BLD POS QL NAA+NON-PROBE: NOT DETECTED
S PYO DNA BLD POS QL NAA+NON-PROBE: NOT DETECTED
SALMONELLA DNA BLD POS QL NAA+NON-PROBE: NOT DETECTED
SERVICE CMNT-IMP: ABNORMAL
SODIUM SERPL-SCNC: 135 MMOL/L (ref 136–145)
SPECIMEN HOLD: NORMAL
SPECIMEN HOLD: NORMAL
STREPTOCOCCUS DNA BLD POS NAA+NON-PROBE: NOT DETECTED
WBC # BLD AUTO: 10.7 K/UL (ref 3.6–11)

## 2023-11-03 PROCEDURE — 36415 COLL VENOUS BLD VENIPUNCTURE: CPT

## 2023-11-03 PROCEDURE — 6360000002 HC RX W HCPCS: Performed by: INTERNAL MEDICINE

## 2023-11-03 PROCEDURE — 6370000000 HC RX 637 (ALT 250 FOR IP): Performed by: INTERNAL MEDICINE

## 2023-11-03 PROCEDURE — 85027 COMPLETE CBC AUTOMATED: CPT

## 2023-11-03 PROCEDURE — 80048 BASIC METABOLIC PNL TOTAL CA: CPT

## 2023-11-03 PROCEDURE — 83036 HEMOGLOBIN GLYCOSYLATED A1C: CPT

## 2023-11-03 PROCEDURE — 99221 1ST HOSP IP/OBS SF/LOW 40: CPT | Performed by: CLINICAL NURSE SPECIALIST

## 2023-11-03 PROCEDURE — 82962 GLUCOSE BLOOD TEST: CPT

## 2023-11-03 PROCEDURE — 83605 ASSAY OF LACTIC ACID: CPT

## 2023-11-03 PROCEDURE — 2580000003 HC RX 258: Performed by: INTERNAL MEDICINE

## 2023-11-03 PROCEDURE — 6370000000 HC RX 637 (ALT 250 FOR IP): Performed by: STUDENT IN AN ORGANIZED HEALTH CARE EDUCATION/TRAINING PROGRAM

## 2023-11-03 PROCEDURE — 1100000000 HC RM PRIVATE

## 2023-11-03 PROCEDURE — 6370000000 HC RX 637 (ALT 250 FOR IP): Performed by: CLINICAL NURSE SPECIALIST

## 2023-11-03 PROCEDURE — 94761 N-INVAS EAR/PLS OXIMETRY MLT: CPT

## 2023-11-03 PROCEDURE — 93010 ELECTROCARDIOGRAM REPORT: CPT | Performed by: SPECIALIST

## 2023-11-03 RX ORDER — ROSUVASTATIN CALCIUM 5 MG/1
5 TABLET, COATED ORAL DAILY
COMMUNITY

## 2023-11-03 RX ORDER — INSULIN LISPRO 100 [IU]/ML
0-4 INJECTION, SOLUTION INTRAVENOUS; SUBCUTANEOUS NIGHTLY
Status: DISCONTINUED | OUTPATIENT
Start: 2023-11-03 | End: 2023-11-06 | Stop reason: HOSPADM

## 2023-11-03 RX ORDER — NORETHINDRONE ACETATE AND ETHINYL ESTRADIOL, AND FERROUS FUMARATE 1MG-20(24)
1 KIT ORAL DAILY
Status: ON HOLD | COMMUNITY
End: 2023-11-06 | Stop reason: HOSPADM

## 2023-11-03 RX ORDER — DULOXETIN HYDROCHLORIDE 60 MG/1
60 CAPSULE, DELAYED RELEASE ORAL DAILY
COMMUNITY

## 2023-11-03 RX ORDER — INSULIN LISPRO 100 [IU]/ML
0-16 INJECTION, SOLUTION INTRAVENOUS; SUBCUTANEOUS
Status: DISCONTINUED | OUTPATIENT
Start: 2023-11-03 | End: 2023-11-06 | Stop reason: HOSPADM

## 2023-11-03 RX ORDER — DULOXETIN HYDROCHLORIDE 30 MG/1
60 CAPSULE, DELAYED RELEASE ORAL DAILY
Status: DISCONTINUED | OUTPATIENT
Start: 2023-11-03 | End: 2023-11-06 | Stop reason: HOSPADM

## 2023-11-03 RX ORDER — LATANOPROST 50 UG/ML
1 SOLUTION/ DROPS OPHTHALMIC NIGHTLY
Status: ON HOLD | COMMUNITY
End: 2023-11-06 | Stop reason: HOSPADM

## 2023-11-03 RX ORDER — DEXTROSE MONOHYDRATE 100 MG/ML
INJECTION, SOLUTION INTRAVENOUS CONTINUOUS PRN
Status: DISCONTINUED | OUTPATIENT
Start: 2023-11-03 | End: 2023-11-06 | Stop reason: HOSPADM

## 2023-11-03 RX ORDER — METOPROLOL TARTRATE 50 MG/1
50 TABLET, FILM COATED ORAL 2 TIMES DAILY
Status: DISCONTINUED | OUTPATIENT
Start: 2023-11-03 | End: 2023-11-06 | Stop reason: HOSPADM

## 2023-11-03 RX ORDER — INSULIN HUMAN 500 [IU]/ML
130 INJECTION, SOLUTION SUBCUTANEOUS 2 TIMES DAILY WITH MEALS
COMMUNITY

## 2023-11-03 RX ORDER — INSULIN LISPRO 100 [IU]/ML
15 INJECTION, SOLUTION INTRAVENOUS; SUBCUTANEOUS
Status: DISCONTINUED | OUTPATIENT
Start: 2023-11-03 | End: 2023-11-06 | Stop reason: HOSPADM

## 2023-11-03 RX ORDER — LOSARTAN POTASSIUM 50 MG/1
100 TABLET ORAL DAILY
Status: DISCONTINUED | OUTPATIENT
Start: 2023-11-03 | End: 2023-11-06 | Stop reason: HOSPADM

## 2023-11-03 RX ORDER — ALPRAZOLAM 0.5 MG/1
0.5 TABLET ORAL DAILY PRN
COMMUNITY

## 2023-11-03 RX ORDER — LOSARTAN POTASSIUM 100 MG/1
100 TABLET ORAL
COMMUNITY

## 2023-11-03 RX ADMIN — LOSARTAN POTASSIUM 100 MG: 50 TABLET, FILM COATED ORAL at 13:32

## 2023-11-03 RX ADMIN — METOPROLOL TARTRATE 50 MG: 50 TABLET ORAL at 20:11

## 2023-11-03 RX ADMIN — INSULIN HUMAN 65 UNITS: 100 INJECTION, SUSPENSION SUBCUTANEOUS at 17:18

## 2023-11-03 RX ADMIN — INSULIN HUMAN 60 UNITS: 100 INJECTION, SUSPENSION SUBCUTANEOUS at 08:47

## 2023-11-03 RX ADMIN — INSULIN LISPRO 8 UNITS: 100 INJECTION, SOLUTION INTRAVENOUS; SUBCUTANEOUS at 08:48

## 2023-11-03 RX ADMIN — SODIUM CHLORIDE: 9 INJECTION, SOLUTION INTRAVENOUS at 21:58

## 2023-11-03 RX ADMIN — INSULIN LISPRO 15 UNITS: 100 INJECTION, SOLUTION INTRAVENOUS; SUBCUTANEOUS at 17:18

## 2023-11-03 RX ADMIN — ACETAMINOPHEN 650 MG: 325 TABLET ORAL at 23:25

## 2023-11-03 RX ADMIN — ACETAMINOPHEN 650 MG: 325 TABLET ORAL at 08:48

## 2023-11-03 RX ADMIN — INSULIN LISPRO 4 UNITS: 100 INJECTION, SOLUTION INTRAVENOUS; SUBCUTANEOUS at 17:18

## 2023-11-03 RX ADMIN — DULOXETINE HYDROCHLORIDE 60 MG: 30 CAPSULE, DELAYED RELEASE ORAL at 13:32

## 2023-11-03 RX ADMIN — ACETAMINOPHEN 650 MG: 325 TABLET ORAL at 00:09

## 2023-11-03 RX ADMIN — ENOXAPARIN SODIUM 30 MG: 100 INJECTION SUBCUTANEOUS at 08:48

## 2023-11-03 RX ADMIN — ACETAMINOPHEN 650 MG: 325 TABLET ORAL at 17:06

## 2023-11-03 RX ADMIN — ENOXAPARIN SODIUM 30 MG: 100 INJECTION SUBCUTANEOUS at 20:09

## 2023-11-03 RX ADMIN — CEFEPIME 2000 MG: 2 INJECTION, POWDER, FOR SOLUTION INTRAVENOUS at 08:48

## 2023-11-03 RX ADMIN — INSULIN LISPRO 15 UNITS: 100 INJECTION, SOLUTION INTRAVENOUS; SUBCUTANEOUS at 13:05

## 2023-11-03 RX ADMIN — CEFEPIME 2000 MG: 2 INJECTION, POWDER, FOR SOLUTION INTRAVENOUS at 17:06

## 2023-11-03 RX ADMIN — SODIUM CHLORIDE: 9 INJECTION, SOLUTION INTRAVENOUS at 08:49

## 2023-11-03 RX ADMIN — SODIUM CHLORIDE, PRESERVATIVE FREE 10 ML: 5 INJECTION INTRAVENOUS at 20:10

## 2023-11-03 ASSESSMENT — PAIN DESCRIPTION - LOCATION
LOCATION: HEAD

## 2023-11-03 ASSESSMENT — PAIN DESCRIPTION - ORIENTATION
ORIENTATION: RIGHT;LEFT;ANTERIOR
ORIENTATION: MID

## 2023-11-03 ASSESSMENT — PAIN SCALES - GENERAL
PAINLEVEL_OUTOF10: 5
PAINLEVEL_OUTOF10: 3

## 2023-11-03 ASSESSMENT — PAIN DESCRIPTION - DESCRIPTORS
DESCRIPTORS: ACHING
DESCRIPTORS: SHARP

## 2023-11-03 NOTE — H&P
Authorizing Provider   Lorcaserin HCl 10 MG TABS Take 10 mg by mouth 2 times daily.     Automatic Reconciliation, Ar   metoprolol tartrate (LOPRESSOR) 50 MG tablet Take 50 mg by mouth 2 times daily 10/29/14   Automatic Reconciliation, Ar   spironolactone (ALDACTONE) 25 MG tablet Take by mouth daily    Automatic Reconciliation, Ar         CURRENT MEDICATION ORDERS:  Current Facility-Administered Medications   Medication Dose Route Frequency Provider Last Rate Last Admin    ceFEPIme (MAXIPIME) 2,000 mg in sodium chloride 0.9 % 100 mL IVPB (mini-bag)  2,000 mg IntraVENous q8h Paulette Hager MD        sodium chloride flush 0.9 % injection 5-40 mL  5-40 mL IntraVENous 2 times per day Paulette Hager MD        sodium chloride flush 0.9 % injection 5-40 mL  5-40 mL IntraVENous PRN Paulette Hager MD        0.9 % sodium chloride infusion   IntraVENous PRN Paulette Hager MD        potassium chloride (KLOR-CON) extended release tablet 40 mEq  40 mEq Oral PRN Paulette Hager MD        Or    potassium bicarb-citric acid (EFFER-K) effervescent tablet 40 mEq  40 mEq Oral PRN Paulette Hager MD        Or    potassium chloride 10 mEq/100 mL IVPB (Peripheral Line)  10 mEq IntraVENous PRN Paulette Hager MD        magnesium sulfate 2000 mg in 50 mL IVPB premix  2,000 mg IntraVENous PRN Paulette Hager MD        enoxaparin Sodium (LOVENOX) injection 30 mg  30 mg SubCUTAneous BID Paulette Hager MD        ondansetron (ZOFRAN-ODT) disintegrating tablet 4 mg  4 mg Oral Q8H PRN Paulette Hager MD        Or    ondansetron [facility]) injection 4 mg  4 mg IntraVENous Q6H PRN Paulette Hager MD        polyethylene glycol (GLYCOLAX) packet 17 g  17 g Oral Daily PRN Paulette Hager MD        acetaminophen (TYLENOL) tablet 650 mg  650 mg Oral Q6H PRN Paulette Hager MD        Or    acetaminophen (TYLENOL) suppository 650 mg  650 mg Rectal Q6H PRN Paulette Hager MD        0.9 % sodium chloride infusion   IntraVENous

## 2023-11-03 NOTE — PROGRESS NOTES
Hospitalist Progress Note      NAME:  Shannan Nino   :  1980  MRM:  [id number]    Date/Time: 11/3/2023  3:36 PM           Assessment / Plan:     44 y.o.  female with history of diabetes, hypertension, depression, and obesity presenting with acute onset of chills. She began to have hot flashes and chills with subjective fever 2 days prior to presentation. Since then she has developed burning with urination and nausea with vomiting x1    ##Severe sepsis  ##UTI  ##Lactic acidosis, resolved   - She was started empirically on vancomycin and cefepime. It appears the source of her sepsis is UTI. CT of the abdomen pelvis negative. Chest x-ray is clear. Proceed with cefepime monotherapy for now. Continue IV fluids with normal saline at 100 cc an hour. plan  Blood cultures were collected and are pending. Follow-up on urine culture. Check respiratory viral panel to rule out other nonbacterial source of infection. #Sinus tachycardia, improving  -Likely secondary to sepsis  -No dyspnea, hypoxia, chest pain, palpitations, or lower extremity edema or other sign of pulm embolus. plan  -Continue IV fluids and antibiotics as above  -Metoprolol was on the patient's home med list but she does not report taking this medication. Begin metoprolol 50 mg p.o. twice daily. -Monitor on telemetry. #Diabetes mellitus  #Severe hyperglycemia    Begin diabetic diet. Start NPH 60 units twice daily. Cover with aggressive sliding scale. Uptitrate basal insulin towards home dose as required. Consult diabetic management. A1c: pending     #Hypertension  Begin metoprolol as above. Hold losartan for now. Spironolactone also on home med list but patient denies taking this medication. #Depression  -Patient is on Cymbalta at home. Zoom once home dose is verified. #Obesity  -Patient would benefit from weight loss. Provide counseling once rapport is established.        I have personally

## 2023-11-03 NOTE — DIABETES MGMT
foot exam:    Left Foot     Visual Exam:Warm & dry. Ticklish. Toe nails with polish    Pulse DP: +2   Right Foot   Visual Exam:Warm & dry. Ticklish. Toe nails with polish   Pulse DP: +2      Laboratory  Recent Labs     11/02/23  1412   WBC 10.4   HGB 14.9   HCT 43.2   MCV 90.8        Recent Labs     11/02/23  1412 11/03/23  0418   *  --    K 4.2  --    CL 96*  --    CO2 26  --    BUN 9  --    CREATININE 1.21* 0.65     Lab Results   Component Value Date    ALT 31 11/02/2023    AST 40 (H) 11/02/2023    ALKPHOS 75 11/02/2023    BILITOT 0.3 11/02/2023     No results found for: \"TSH\", \"TSHREFLEX\", \"TSHFT4\", \"TSHELE\", \"WQB3WEJ\", \"TSHHS\"   No results found for: \"LABA1C\"    Factors impacting BG management  Factor Dose Comments   Nutrition:  Standard meals   45 grams/meal   Not hungry   Pain Tylenol PRN None at this time   Infection: Urinary sepsis Cefepime Q8 hrs  Vanc X2 Low grade fever  WBC normal   Other:   Kidney function  Liver function   OANH resolved  AST elevated      Blood glucose pattern      Significant diabetes-related events over the past 24-72 hours  11/2/23 Admission BG 4887. OANH  1/3/23 .  OANH resolved    Assessment and Nursing Intervention   Nursing Diagnosis Risk for unstable blood glucose pattern   Nursing Intervention Domain 3677 Decision-making Support   Nursing Interventions Examined current inpatient diabetes/blood glucose control   Explored factors facilitating and impeding inpatient management  Explored corrective strategies with patient and responsible inpatient provider   Informed patient of rational for insulin strategy while hospitalized     Billing Code(s)   [] 11034 IP subsequent hospital care - 50 minutes   [] 35380 IP subsequent hospital care - 35 minutes   [] [phone] IP subsequent hospital care - 25 minutes   [x] [id number] IP initial hospital care - 40 minutes     Before making these care recommendations, I personally reviewed the hospitalization record, including notes,

## 2023-11-03 NOTE — PROGRESS NOTES
Medication History Review by Pharmacist:    Information obtained from: Patient       Allergies: Patient has no known allergies. Comment:  Patient confirms that she has Insulin U-500 pen and her dose is 130 units twice daily. She dials the dose on the pen. Prior to Admission Medications:   Prior to Admission Medications   Prescriptions Last Dose Informant Patient Reported? Taking? ALPRAZolam (XANAX) 0.5 MG tablet  Self Yes Yes   Sig: Take 1 tablet by mouth daily as needed for Anxiety.    DULoxetine (CYMBALTA) 60 MG extended release capsule 11/3/2023 Self Yes Yes   Sig: Take 1 capsule by mouth daily      Yes No      Norethin Ace-Eth Estrad-FE 1-20 MG-MCG(24) CAPS  Self Yes Yes   Sig: Take 1 capsule by mouth daily   insulin regular human (HUMULIN R U-500 KWIKPEN) 500 UNIT/ML SOPN concentrated injection pen 11/2/2023 at AM Self Yes Yes   Sig: Inject 130 Units into the skin 2 times daily (with meals)   latanoprost (XALATAN) 0.005 % ophthalmic solution  Self Yes Yes   Sig: Place 1 drop into both eyes nightly   losartan (COZAAR) 100 MG tablet 11/3/2023 Self Yes Yes   Sig: Take 1 tablet by mouth nightly      Yes No      rosuvastatin (CRESTOR) 5 MG tablet  Self Yes Yes   Sig: Take 1 tablet by mouth daily      Yes No

## 2023-11-04 LAB
ANION GAP SERPL CALC-SCNC: 5 MMOL/L (ref 5–15)
BUN SERPL-MCNC: 9 MG/DL (ref 6–20)
BUN/CREAT SERPL: 14 (ref 12–20)
CALCIUM SERPL-MCNC: 7.5 MG/DL (ref 8.5–10.1)
CHLORIDE SERPL-SCNC: 109 MMOL/L (ref 97–108)
CO2 SERPL-SCNC: 23 MMOL/L (ref 21–32)
CREAT SERPL-MCNC: 0.64 MG/DL (ref 0.55–1.02)
ERYTHROCYTE [DISTWIDTH] IN BLOOD BY AUTOMATED COUNT: 13.7 % (ref 11.5–14.5)
GLUCOSE BLD STRIP.AUTO-MCNC: 159 MG/DL (ref 65–117)
GLUCOSE BLD STRIP.AUTO-MCNC: 197 MG/DL (ref 65–117)
GLUCOSE BLD STRIP.AUTO-MCNC: 211 MG/DL (ref 65–117)
GLUCOSE BLD STRIP.AUTO-MCNC: 225 MG/DL (ref 65–117)
GLUCOSE SERPL-MCNC: 204 MG/DL (ref 65–100)
HCT VFR BLD AUTO: 36.9 % (ref 35–47)
HGB BLD-MCNC: 11.9 G/DL (ref 11.5–16)
MCH RBC QN AUTO: 29.5 PG (ref 26–34)
MCHC RBC AUTO-ENTMCNC: 32.2 G/DL (ref 30–36.5)
MCV RBC AUTO: 91.3 FL (ref 80–99)
NRBC # BLD: 0 K/UL (ref 0–0.01)
NRBC BLD-RTO: 0 PER 100 WBC
PLATELET # BLD AUTO: 162 K/UL (ref 150–400)
PMV BLD AUTO: 10.6 FL (ref 8.9–12.9)
POTASSIUM SERPL-SCNC: 3.6 MMOL/L (ref 3.5–5.1)
RBC # BLD AUTO: 4.04 M/UL (ref 3.8–5.2)
SERVICE CMNT-IMP: ABNORMAL
SODIUM SERPL-SCNC: 137 MMOL/L (ref 136–145)
WBC # BLD AUTO: 10.3 K/UL (ref 3.6–11)

## 2023-11-04 PROCEDURE — 6370000000 HC RX 637 (ALT 250 FOR IP): Performed by: STUDENT IN AN ORGANIZED HEALTH CARE EDUCATION/TRAINING PROGRAM

## 2023-11-04 PROCEDURE — 85027 COMPLETE CBC AUTOMATED: CPT

## 2023-11-04 PROCEDURE — 6360000002 HC RX W HCPCS: Performed by: INTERNAL MEDICINE

## 2023-11-04 PROCEDURE — 6370000000 HC RX 637 (ALT 250 FOR IP): Performed by: INTERNAL MEDICINE

## 2023-11-04 PROCEDURE — 94761 N-INVAS EAR/PLS OXIMETRY MLT: CPT

## 2023-11-04 PROCEDURE — 82962 GLUCOSE BLOOD TEST: CPT

## 2023-11-04 PROCEDURE — 87040 BLOOD CULTURE FOR BACTERIA: CPT

## 2023-11-04 PROCEDURE — 6370000000 HC RX 637 (ALT 250 FOR IP): Performed by: CLINICAL NURSE SPECIALIST

## 2023-11-04 PROCEDURE — 1100000000 HC RM PRIVATE

## 2023-11-04 PROCEDURE — 36415 COLL VENOUS BLD VENIPUNCTURE: CPT

## 2023-11-04 PROCEDURE — 80048 BASIC METABOLIC PNL TOTAL CA: CPT

## 2023-11-04 PROCEDURE — 2580000003 HC RX 258: Performed by: INTERNAL MEDICINE

## 2023-11-04 RX ORDER — FLUCONAZOLE 100 MG/1
200 TABLET ORAL DAILY
Status: DISCONTINUED | OUTPATIENT
Start: 2023-11-04 | End: 2023-11-06 | Stop reason: HOSPADM

## 2023-11-04 RX ADMIN — DULOXETINE HYDROCHLORIDE 60 MG: 30 CAPSULE, DELAYED RELEASE ORAL at 09:12

## 2023-11-04 RX ADMIN — CEFEPIME 2000 MG: 2 INJECTION, POWDER, FOR SOLUTION INTRAVENOUS at 15:21

## 2023-11-04 RX ADMIN — ACETAMINOPHEN 650 MG: 325 TABLET ORAL at 09:12

## 2023-11-04 RX ADMIN — SODIUM CHLORIDE, PRESERVATIVE FREE 10 ML: 5 INJECTION INTRAVENOUS at 09:18

## 2023-11-04 RX ADMIN — INSULIN LISPRO 4 UNITS: 100 INJECTION, SOLUTION INTRAVENOUS; SUBCUTANEOUS at 12:41

## 2023-11-04 RX ADMIN — ENOXAPARIN SODIUM 30 MG: 100 INJECTION SUBCUTANEOUS at 21:51

## 2023-11-04 RX ADMIN — METOPROLOL TARTRATE 50 MG: 50 TABLET ORAL at 09:12

## 2023-11-04 RX ADMIN — ENOXAPARIN SODIUM 30 MG: 100 INJECTION SUBCUTANEOUS at 09:12

## 2023-11-04 RX ADMIN — METOPROLOL TARTRATE 50 MG: 50 TABLET ORAL at 21:51

## 2023-11-04 RX ADMIN — CEFEPIME 2000 MG: 2 INJECTION, POWDER, FOR SOLUTION INTRAVENOUS at 09:17

## 2023-11-04 RX ADMIN — CEFEPIME 2000 MG: 2 INJECTION, POWDER, FOR SOLUTION INTRAVENOUS at 00:20

## 2023-11-04 RX ADMIN — SODIUM CHLORIDE, PRESERVATIVE FREE 10 ML: 5 INJECTION INTRAVENOUS at 21:52

## 2023-11-04 RX ADMIN — INSULIN LISPRO 15 UNITS: 100 INJECTION, SOLUTION INTRAVENOUS; SUBCUTANEOUS at 17:55

## 2023-11-04 RX ADMIN — LOSARTAN POTASSIUM 100 MG: 50 TABLET, FILM COATED ORAL at 09:12

## 2023-11-04 RX ADMIN — FLUCONAZOLE 200 MG: 100 TABLET ORAL at 09:12

## 2023-11-04 RX ADMIN — INSULIN LISPRO 4 UNITS: 100 INJECTION, SOLUTION INTRAVENOUS; SUBCUTANEOUS at 17:55

## 2023-11-04 NOTE — PLAN OF CARE
Problem: Discharge Planning  Goal: Discharge to home or other facility with appropriate resources  11/3/2023 4133 by Soheila Snell RN  Outcome: Progressing  11/3/2023 9276 by Soheila Snell RN  Outcome: Progressing     Problem: Chronic Conditions and Co-morbidities  Goal: Patient's chronic conditions and co-morbidity symptoms are monitored and maintained or improved  11/3/2023 8558 by Soheila Snell RN  Outcome: Progressing  11/3/2023 1772 by Soheila Snell RN  Outcome: Progressing  11/3/2023 1548 by Norris Kevin RN  Outcome: Progressing

## 2023-11-04 NOTE — CARE COORDINATION
11/04/23 1234   Service Assessment   Patient Orientation Alert and Oriented   Cognition Alert   History Provided By Patient   Primary Vickey Ruiz [place] Parent   PCP Verified by CM Yes   Last Visit to PCP Within last 3 months   Prior Functional Level Independent in ADLs/IADLs   Current Functional Level Independent in ADLs/IADLs   Can patient return to prior living arrangement Yes   Ability to make needs known: Good   Family able to assist with home care needs: Yes   Financial Resources None   Social/Functional History   Lives With Alone   Type of [address] Help From Family   ADL Assistance Independent   Homemaking Assistance Independent   Ambulation Assistance Independent   Transfer Assistance Independent   Active  Yes   Mode of Transportation Car   Discharge Planning   Type of [address] Alone   Patient expects to be discharged to: House   History of falls? 0   Services At/After Discharge   Confirm Follow Up Transport Family       Transition of care  RUR 7%  Sepsis   IVF  Cx pending  Daily labs    NCM met with patient at bedside. Patient reports independence at home with no DME needs. Patient plans to move into her parents home in [place], [place] when medically ready for discharge. No cm needs at this time. Verified with patient no health insurance to report,  Financial assistance application provided to patient.   Guille Juarez

## 2023-11-04 NOTE — PROGRESS NOTES
Hospitalist Progress Note      NAME:  Sonal Rizo   :  1980  MRM:  [id number]    Date/Time: 2023  12:58 PM           Assessment / Plan:     44 y.o.  female with history of diabetes, hypertension, depression, and obesity presenting with acute onset of chills. She began to have hot flashes and chills with subjective fever 2 days prior to presentation. Since then she has developed burning with urination and nausea with vomiting x1    ##Severe sepsis  ##UTI    - She was started empirically on vancomycin and cefepime. It appears the source of her sepsis is UTI. Proceed with cefepime monotherapy for now. -CT of the abdomen pelvis negative.    -Chest x-ray is clear.    -Blood cultures: Gram negative   -Urine, yeast +:  Plan  Continue IV fluids with normal saline at 100 cc an hour. Continue Cefepime   Continue oral fluconazole x 14 days for UTI      #Sinus tachycardia, improved   -Likely secondary to sepsis  -No dyspnea, hypoxia, chest pain, palpitations, or lower extremity edema or other sign of pulm embolus. plan  -Continue IV fluids and antibiotics as above  metoprolol 50 mg p.o. twice daily. -Monitor on telemetry. #Diabetes mellitus  #Severe hyperglycemia    Begin diabetic diet. Start NPH 60 units twice daily. Cover with aggressive sliding scale. Uptitrate basal insulin towards home dose as required. #Hypertension  Begin metoprolol as above. Hold losartan for now. Spironolactone also on home med list but patient denies taking this medication. #Depression  -Patient is on Cymbalta at home. Zoom once home dose is verified. #Obesity  -Patient would benefit from weight loss. Provide counseling once rapport is established. Pt would benefit from sleepy study, and CPAP          I have personally reviewed the radiographs, laboratory data in Epic and decisions and statements above are based partially on this personal interpretation.                  Care Plan

## 2023-11-04 NOTE — PLAN OF CARE
Problem: Discharge Planning  Goal: Discharge to home or other facility with appropriate resources  11/4/2023 1052 by Beatris Verdugo RN  Outcome: Progressing  11/3/2023 3030 by Kip Schwab, RN  Outcome: Progressing     Problem: Chronic Conditions and Co-morbidities  Goal: Patient's chronic conditions and co-morbidity symptoms are monitored and maintained or improved  11/4/2023 1052 by Beatris Verdugo RN  Outcome: Progressing  11/3/2023 0828 by Kip Schwab, RN  Outcome: Progressing

## 2023-11-05 LAB
ANION GAP SERPL CALC-SCNC: 8 MMOL/L (ref 5–15)
BACTERIA SPEC CULT: ABNORMAL
BUN SERPL-MCNC: 7 MG/DL (ref 6–20)
BUN/CREAT SERPL: 13 (ref 12–20)
CALCIUM SERPL-MCNC: 7.8 MG/DL (ref 8.5–10.1)
CHLORIDE SERPL-SCNC: 107 MMOL/L (ref 97–108)
CO2 SERPL-SCNC: 21 MMOL/L (ref 21–32)
CREAT SERPL-MCNC: 0.52 MG/DL (ref 0.55–1.02)
ERYTHROCYTE [DISTWIDTH] IN BLOOD BY AUTOMATED COUNT: 13.6 % (ref 11.5–14.5)
GLUCOSE BLD STRIP.AUTO-MCNC: 167 MG/DL (ref 65–117)
GLUCOSE BLD STRIP.AUTO-MCNC: 170 MG/DL (ref 65–117)
GLUCOSE BLD STRIP.AUTO-MCNC: 199 MG/DL (ref 65–117)
GLUCOSE BLD STRIP.AUTO-MCNC: 203 MG/DL (ref 65–117)
GLUCOSE SERPL-MCNC: 166 MG/DL (ref 65–100)
HCT VFR BLD AUTO: 37.1 % (ref 35–47)
HGB BLD-MCNC: 12 G/DL (ref 11.5–16)
HIV 1+2 AB+HIV1 P24 AG SERPL QL IA: NONREACTIVE
HIV 1/2 RESULT COMMENT: NORMAL
MCH RBC QN AUTO: 29.6 PG (ref 26–34)
MCHC RBC AUTO-ENTMCNC: 32.3 G/DL (ref 30–36.5)
MCV RBC AUTO: 91.6 FL (ref 80–99)
NRBC # BLD: 0 K/UL (ref 0–0.01)
NRBC BLD-RTO: 0 PER 100 WBC
PLATELET # BLD AUTO: 188 K/UL (ref 150–400)
PMV BLD AUTO: 10.8 FL (ref 8.9–12.9)
POTASSIUM SERPL-SCNC: 3.2 MMOL/L (ref 3.5–5.1)
RBC # BLD AUTO: 4.05 M/UL (ref 3.8–5.2)
SERVICE CMNT-IMP: ABNORMAL
SODIUM SERPL-SCNC: 136 MMOL/L (ref 136–145)
WBC # BLD AUTO: 9.2 K/UL (ref 3.6–11)

## 2023-11-05 PROCEDURE — 94761 N-INVAS EAR/PLS OXIMETRY MLT: CPT

## 2023-11-05 PROCEDURE — 6370000000 HC RX 637 (ALT 250 FOR IP): Performed by: INTERNAL MEDICINE

## 2023-11-05 PROCEDURE — 2580000003 HC RX 258: Performed by: INTERNAL MEDICINE

## 2023-11-05 PROCEDURE — 82962 GLUCOSE BLOOD TEST: CPT

## 2023-11-05 PROCEDURE — 6370000000 HC RX 637 (ALT 250 FOR IP): Performed by: CLINICAL NURSE SPECIALIST

## 2023-11-05 PROCEDURE — 85027 COMPLETE CBC AUTOMATED: CPT

## 2023-11-05 PROCEDURE — 1100000000 HC RM PRIVATE

## 2023-11-05 PROCEDURE — 6360000002 HC RX W HCPCS: Performed by: INTERNAL MEDICINE

## 2023-11-05 PROCEDURE — 6370000000 HC RX 637 (ALT 250 FOR IP): Performed by: STUDENT IN AN ORGANIZED HEALTH CARE EDUCATION/TRAINING PROGRAM

## 2023-11-05 PROCEDURE — 80048 BASIC METABOLIC PNL TOTAL CA: CPT

## 2023-11-05 PROCEDURE — 36415 COLL VENOUS BLD VENIPUNCTURE: CPT

## 2023-11-05 PROCEDURE — 87389 HIV-1 AG W/HIV-1&-2 AB AG IA: CPT

## 2023-11-05 RX ORDER — POTASSIUM CHLORIDE 750 MG/1
40 TABLET, FILM COATED, EXTENDED RELEASE ORAL ONCE
Status: COMPLETED | OUTPATIENT
Start: 2023-11-05 | End: 2023-11-05

## 2023-11-05 RX ORDER — IPRATROPIUM BROMIDE AND ALBUTEROL SULFATE 2.5; .5 MG/3ML; MG/3ML
1 SOLUTION RESPIRATORY (INHALATION) EVERY 4 HOURS PRN
Status: DISCONTINUED | OUTPATIENT
Start: 2023-11-05 | End: 2023-11-06 | Stop reason: HOSPADM

## 2023-11-05 RX ORDER — IPRATROPIUM BROMIDE AND ALBUTEROL SULFATE 2.5; .5 MG/3ML; MG/3ML
1 SOLUTION RESPIRATORY (INHALATION)
Status: DISCONTINUED | OUTPATIENT
Start: 2023-11-05 | End: 2023-11-05

## 2023-11-05 RX ADMIN — ENOXAPARIN SODIUM 30 MG: 100 INJECTION SUBCUTANEOUS at 20:59

## 2023-11-05 RX ADMIN — FLUCONAZOLE 200 MG: 100 TABLET ORAL at 08:21

## 2023-11-05 RX ADMIN — DULOXETINE HYDROCHLORIDE 60 MG: 30 CAPSULE, DELAYED RELEASE ORAL at 08:22

## 2023-11-05 RX ADMIN — CEFEPIME 2000 MG: 2 INJECTION, POWDER, FOR SOLUTION INTRAVENOUS at 08:21

## 2023-11-05 RX ADMIN — METOPROLOL TARTRATE 50 MG: 50 TABLET ORAL at 08:21

## 2023-11-05 RX ADMIN — CEFEPIME 2000 MG: 2 INJECTION, POWDER, FOR SOLUTION INTRAVENOUS at 15:33

## 2023-11-05 RX ADMIN — LOSARTAN POTASSIUM 100 MG: 50 TABLET, FILM COATED ORAL at 08:22

## 2023-11-05 RX ADMIN — SODIUM CHLORIDE, PRESERVATIVE FREE 10 ML: 5 INJECTION INTRAVENOUS at 08:23

## 2023-11-05 RX ADMIN — INSULIN HUMAN 65 UNITS: 100 INJECTION, SUSPENSION SUBCUTANEOUS at 17:41

## 2023-11-05 RX ADMIN — INSULIN HUMAN 65 UNITS: 100 INJECTION, SUSPENSION SUBCUTANEOUS at 08:21

## 2023-11-05 RX ADMIN — POTASSIUM CHLORIDE 40 MEQ: 750 TABLET, FILM COATED, EXTENDED RELEASE ORAL at 08:22

## 2023-11-05 RX ADMIN — CEFEPIME 2000 MG: 2 INJECTION, POWDER, FOR SOLUTION INTRAVENOUS at 00:07

## 2023-11-05 RX ADMIN — INSULIN LISPRO 4 UNITS: 100 INJECTION, SOLUTION INTRAVENOUS; SUBCUTANEOUS at 11:52

## 2023-11-05 RX ADMIN — ENOXAPARIN SODIUM 30 MG: 100 INJECTION SUBCUTANEOUS at 08:21

## 2023-11-05 RX ADMIN — METOPROLOL TARTRATE 50 MG: 50 TABLET ORAL at 20:59

## 2023-11-05 RX ADMIN — SODIUM CHLORIDE, PRESERVATIVE FREE 10 ML: 5 INJECTION INTRAVENOUS at 20:59

## 2023-11-05 ASSESSMENT — PAIN SCALES - GENERAL: PAINLEVEL_OUTOF10: 0

## 2023-11-05 NOTE — PROGRESS NOTES
Hospitalist Progress Note      NAME:  Jose Cruz   :  1980  MRM:  [id number]    Date/Time: 2023  1:05 PM           Assessment / Plan:     44 y.o.  female with history of diabetes, hypertension, depression, and obesity presenting with acute onset of chills. She began to have hot flashes and chills with subjective fever 2 days prior to presentation. Since then she has developed burning with urination and nausea with vomiting x1, pt was admitted for urosepsis, she is currently improving and stable     ##Severe sepsis [ improving ]   ##UTI    - She was started empirically on vancomycin and cefepime. It appears the source of her sepsis is UTI. Proceed with cefepime monotherapy for now. -CT of the abdomen pelvis negative.    -Chest x-ray is clear.    -Blood cultures: Gram negative, repeated BC: NGSF   -Urine, yeast +:  Plan  Continue IV fluids with normal saline at 100 cc an hour. Continue Cefepime   Continue oral fluconazole x 14 days for UTI   -At pt's WBC has been stable, will check HIV      #Sinus tachycardia, resolved    -Likely secondary to sepsis  -No dyspnea, hypoxia, chest pain, palpitations, or lower extremity edema or other sign of pulm embolus. plan  -Continue IV fluids and antibiotics as above  metoprolol 50 mg p.o. twice daily. -Monitor on telemetry. #Diabetes mellitus  #Severe hyperglycemia    Begin diabetic diet. Start NPH 60 units twice daily. Cover with aggressive sliding scale. Uptitrate basal insulin towards home dose as required. #Hypertension  Begin metoprolol as above. Hold losartan for now. Spironolactone also on home med list but patient denies taking this medication. #Depression  -Patient is on Cymbalta at home. Zoom once home dose is verified. #Obesity  -Patient would benefit from weight loss. Provide counseling once rapport is established.   Pt would benefit from sleepy study, and CPAP          I have personally reviewed the

## 2023-11-05 NOTE — PLAN OF CARE
Problem: Chronic Conditions and Co-morbidities  Goal: Patient's chronic conditions and co-morbidity symptoms are monitored and maintained or improved  11/5/2023 1038 by Kay Arora RN  Outcome: Progressing  11/5/2023 0543 by Abdiaziz Gutierrez RN  Outcome: Progressing     Problem: Discharge Planning  Goal: Discharge to home or other facility with appropriate resources  11/5/2023 1038 by Kay Arora RN  Outcome: Progressing  11/5/2023 0543 by Abdiaziz Gutierrez RN  Outcome: Progressing

## 2023-11-06 VITALS
SYSTOLIC BLOOD PRESSURE: 125 MMHG | TEMPERATURE: 97.9 F | DIASTOLIC BLOOD PRESSURE: 74 MMHG | HEART RATE: 86 BPM | BODY MASS INDEX: 46.31 KG/M2 | OXYGEN SATURATION: 96 % | WEIGHT: 288.14 LBS | HEIGHT: 66 IN | RESPIRATION RATE: 18 BRPM

## 2023-11-06 PROBLEM — A41.9 SEPSIS (HCC): Status: RESOLVED | Noted: 2023-11-02 | Resolved: 2023-11-06

## 2023-11-06 PROBLEM — R50.9 FEVER: Status: RESOLVED | Noted: 2023-11-03 | Resolved: 2023-11-06

## 2023-11-06 LAB
GLUCOSE BLD STRIP.AUTO-MCNC: 156 MG/DL (ref 65–117)
SERVICE CMNT-IMP: ABNORMAL

## 2023-11-06 PROCEDURE — 6360000002 HC RX W HCPCS: Performed by: INTERNAL MEDICINE

## 2023-11-06 PROCEDURE — 82962 GLUCOSE BLOOD TEST: CPT

## 2023-11-06 PROCEDURE — 2580000003 HC RX 258: Performed by: INTERNAL MEDICINE

## 2023-11-06 PROCEDURE — 94761 N-INVAS EAR/PLS OXIMETRY MLT: CPT

## 2023-11-06 PROCEDURE — 6370000000 HC RX 637 (ALT 250 FOR IP): Performed by: STUDENT IN AN ORGANIZED HEALTH CARE EDUCATION/TRAINING PROGRAM

## 2023-11-06 PROCEDURE — 6370000000 HC RX 637 (ALT 250 FOR IP): Performed by: CLINICAL NURSE SPECIALIST

## 2023-11-06 PROCEDURE — 6370000000 HC RX 637 (ALT 250 FOR IP): Performed by: INTERNAL MEDICINE

## 2023-11-06 RX ORDER — LEVOFLOXACIN 750 MG/1
750 TABLET ORAL DAILY
Qty: 10 TABLET | Refills: 0 | Status: SHIPPED | OUTPATIENT
Start: 2023-11-06 | End: 2023-11-06 | Stop reason: HOSPADM

## 2023-11-06 RX ORDER — FLUCONAZOLE 200 MG/1
200 TABLET ORAL DAILY
Qty: 7 TABLET | Refills: 0 | Status: SHIPPED | OUTPATIENT
Start: 2023-11-07 | End: 2023-11-06 | Stop reason: SDUPTHER

## 2023-11-06 RX ORDER — CEPHALEXIN 500 MG/1
1000 CAPSULE ORAL 4 TIMES DAILY
Qty: 80 CAPSULE | Refills: 0 | Status: SHIPPED | OUTPATIENT
Start: 2023-11-06 | End: 2023-11-16

## 2023-11-06 RX ORDER — FLUCONAZOLE 200 MG/1
200 TABLET ORAL DAILY
Qty: 7 TABLET | Refills: 0 | Status: SHIPPED | OUTPATIENT
Start: 2023-11-07 | End: 2023-11-14

## 2023-11-06 RX ORDER — METOPROLOL TARTRATE 50 MG/1
50 TABLET, FILM COATED ORAL 2 TIMES DAILY
Qty: 14 TABLET | Refills: 0 | Status: SHIPPED | OUTPATIENT
Start: 2023-11-06

## 2023-11-06 RX ADMIN — CEFEPIME 2000 MG: 2 INJECTION, POWDER, FOR SOLUTION INTRAVENOUS at 03:33

## 2023-11-06 RX ADMIN — DULOXETINE HYDROCHLORIDE 60 MG: 30 CAPSULE, DELAYED RELEASE ORAL at 08:57

## 2023-11-06 RX ADMIN — METOPROLOL TARTRATE 50 MG: 50 TABLET ORAL at 08:58

## 2023-11-06 RX ADMIN — ENOXAPARIN SODIUM 30 MG: 100 INJECTION SUBCUTANEOUS at 08:58

## 2023-11-06 RX ADMIN — INSULIN HUMAN 65 UNITS: 100 INJECTION, SUSPENSION SUBCUTANEOUS at 08:57

## 2023-11-06 RX ADMIN — FLUCONAZOLE 200 MG: 100 TABLET ORAL at 08:57

## 2023-11-06 RX ADMIN — SODIUM CHLORIDE, PRESERVATIVE FREE 10 ML: 5 INJECTION INTRAVENOUS at 08:58

## 2023-11-06 RX ADMIN — LOSARTAN POTASSIUM 100 MG: 50 TABLET, FILM COATED ORAL at 08:57

## 2023-11-06 NOTE — CARE COORDINATION
11/6/2023  9:33 AM  Care Management Progress Note      ICD-10-CM    1. Fever in other diseases  R50.81       2. Severe sepsis ([address])  A41.9     R65.20           RUR:  5%  Risk Level: [x]Low []Moderate []High    Transition of care plan:  Discharge order submitted. Pt has medically cleared. Home. No CM needs indicated. Outpatient follow-up. Pt's family to transport. 11/04/23 1234   Service Assessment   Patient Orientation Alert and Oriented   Cognition Alert   History Provided By Patient   Primary Erasmo DEBI [place] Parent   PCP Verified by CM Yes   Last Visit to PCP Within last 3 months   Prior Functional Level Independent in ADLs/IADLs   Current Functional Level Independent in ADLs/IADLs   Can patient return to prior living arrangement Yes   Ability to make needs known: Good   Family able to assist with home care needs: Yes   Financial Resources None   Social/Functional History   Lives With Alone   Type of [address] Help From Family   ADL Assistance Independent   Homemaking Assistance Independent   Ambulation Assistance Independent   Transfer Assistance Independent   Active  Yes   Mode of Transportation Car   Discharge Planning   Type of [address] Alone   Patient expects to be discharged to: House   History of falls?  0   Services At/After Discharge   Services At/After Discharge None   Confirm Follow Up Transport Family

## 2023-11-06 NOTE — DISCHARGE SUMMARY
Hospitalist Discharge Summary     Patient ID:  Nate Kendrick  [id number]  44 y.o.  1980    Admit date: 11/2/2023    Discharge date and time: 11/6/2023    Admission Diagnoses: Sepsis ([address]) [A41.9]  Severe sepsis ([address]) [A41.9, R65.20]  Fever in other diseases [R50.81]    Discharge Diagnoses:    Principal Problem (Resolved):    Sepsis ([address])  Active Problems:    Insulin resistance syndrome    Poorly controlled diabetes mellitus ([address])  Resolved Problems:    Fever         Hospital Course:   44 y.o.  female with history of diabetes, hypertension, depression, and obesity presenting with acute onset of chills. She began to have hot flashes and chills with subjective fever 2 days prior to presentation. Since then she has developed burning with urination and nausea with vomiting x1, pt was admitted for urosepsis, she is currently improving and stable      ##Bacteremia   ##UTI     - She was started empirically on vancomycin and cefepime. It appears the source of her sepsis is UTI. Proceed with cefepime monotherapy   -CT of the abdomen pelvis negative.    -Chest x-ray is clear.    -Blood cultures: Gram negative, repeated BC: NG  -Urine, yeast +:  Plan  As pt has tolerted cefepime in house, will continue treatment on high dose keflex outpatient x 10 days   Continue oral fluconazole x 10 days for UTI      the duration of antibiotic therapy is 10 days totak   For patients with uncomplicated Enterobacteriaceae bacteremia who respond appropriately to antibiotic therapy (eg, no underlying endovascular, bone, joint, or CNS infection, no uncontrolled source of infection, no major immunocompromising condition, and with clinical improvement within 48 to 72 hours), pt has remained afebrile for 48 hours, antibiotics can  be switched to an oral agent     #Sinus tachycardia, resolved    -Likely secondary to sepsis  -No dyspnea, hypoxia, chest pain, palpitations, or lower extremity edema or other sign of pulm embolus.   metoprolol

## 2023-11-09 NOTE — PROGRESS NOTES
Physician Progress Note      Alon Story  [facility] #:                  [id number]  :                       1980  ADMIT DATE:       2023 1:28 PM  [facility] DATE:        2023 11:24 AM  RESPONDING  PROVIDER #:        Rossy Burdick MD          QUERY TEXTRalph Kaelyn Afternoon    This patient admitted on 2023-2023- for Severe Sepsis due to UTI. The discharge summary has Bacteremia and UTI. \"Uncomplicated Enterobacteriaceae bacteremia\"    Since this is conflicting in the documentation, a query is needed to clarify   the Severe Sepsis diagnosis. The medical record reflects the following:  Risk Factors: UTI, DM, Obesity  Clinical Indicators: / SIRS, dx with tachycardia HR in the 111-120s,   reported chills at home but afebrile here. Lactic acid 5.72 BC 1:2 E-coli. UA   only completed 1+ grow bacteria negative nitrates, Discharge summary notes   Uncomplicated Enterobacteriaceae Bacteremia\", Urine + for yeast.  Treatment: Received Initially IV Vanco, Received total of 3 IVF boluses in ER,   and then NS @ 100cc/hr, IV Maxipeme    Thank you  STEPHANIE ShafferN, RN, CPHQ, CCDS, SMART  Options provided:  -- After study, Severe Sepsis POA due to UTI was ruled out. Bacteremia without   Sepsis. -- Severe Sepsis POA due to UTI is confirmed. -- Other - I will add my own diagnosis  -- Disagree - Not applicable / Not valid  -- Disagree - Clinically unable to determine / Unknown  -- Refer to Clinical Documentation Reviewer    PROVIDER RESPONSE TEXT:    Severe Sepsis POA due to UTI was ruled out after study. Bacteremia without   Sepsis. Query created by: Fahad Hernandez on 2023 1:07 PM      Electronically signed by:   Rossy Burdick MD 2023 8:05 AM

## 2023-11-10 LAB
BACTERIA SPEC CULT: NORMAL
BACTERIA SPEC CULT: NORMAL
SERVICE CMNT-IMP: NORMAL
SERVICE CMNT-IMP: NORMAL

## 2023-11-27 ENCOUNTER — OFFICE VISIT (OUTPATIENT)
Age: 43
End: 2023-11-27

## 2023-11-27 VITALS
BODY MASS INDEX: 45.64 KG/M2 | OXYGEN SATURATION: 95 % | TEMPERATURE: 97.6 F | HEART RATE: 113 BPM | DIASTOLIC BLOOD PRESSURE: 87 MMHG | SYSTOLIC BLOOD PRESSURE: 141 MMHG | HEIGHT: 66 IN | WEIGHT: 284 LBS

## 2023-11-27 DIAGNOSIS — Z75.8 DOES NOT HAVE PRIMARY CARE PROVIDER: ICD-10-CM

## 2023-11-27 DIAGNOSIS — T36.95XA ANTIBIOTIC-INDUCED YEAST INFECTION: ICD-10-CM

## 2023-11-27 DIAGNOSIS — R81 GLUCOSURIA: ICD-10-CM

## 2023-11-27 DIAGNOSIS — B37.9 ANTIBIOTIC-INDUCED YEAST INFECTION: ICD-10-CM

## 2023-11-27 DIAGNOSIS — R30.0 DYSURIA: Primary | ICD-10-CM

## 2023-11-27 DIAGNOSIS — I10 ELEVATED BLOOD PRESSURE READING WITH DIAGNOSIS OF HYPERTENSION: ICD-10-CM

## 2023-11-27 LAB
BILIRUBIN, URINE, POC: NEGATIVE
BLOOD URINE, POC: NEGATIVE
GLUCOSE URINE, POC: ABNORMAL
KETONES, URINE, POC: NEGATIVE
LEUKOCYTE ESTERASE, URINE, POC: NEGATIVE
NITRITE, URINE, POC: NEGATIVE
PH, URINE, POC: 6 (ref 4.6–8)
PROTEIN,URINE, POC: NEGATIVE
SPECIFIC GRAVITY, URINE, POC: 1.01 (ref 1–1.03)
URINALYSIS CLARITY, POC: ABNORMAL
URINALYSIS COLOR, POC: YELLOW
UROBILINOGEN, POC: ABNORMAL

## 2023-11-27 RX ORDER — ONDANSETRON 4 MG/1
4 TABLET, FILM COATED ORAL 3 TIMES DAILY PRN
Qty: 15 TABLET | Refills: 0 | Status: SHIPPED | OUTPATIENT
Start: 2023-11-27

## 2023-11-27 RX ORDER — FLUCONAZOLE 150 MG/1
150 TABLET ORAL ONCE
Qty: 1 TABLET | Refills: 0 | Status: SHIPPED | OUTPATIENT
Start: 2023-11-27 | End: 2023-11-27

## 2023-11-27 RX ORDER — NITROFURANTOIN 25; 75 MG/1; MG/1
100 CAPSULE ORAL 2 TIMES DAILY
Qty: 10 CAPSULE | Refills: 0 | Status: SHIPPED | OUTPATIENT
Start: 2023-11-27 | End: 2023-12-02

## 2023-11-27 NOTE — PATIENT INSTRUCTIONS
- Push fluids    - Take antibiotic as directed with food to minimize stomach upset. - Get established and follow up with primary care.

## 2023-11-27 NOTE — PROGRESS NOTES
MD Yury   ALPRAZolam (XANAX) 0.5 MG tablet Take 1 tablet by mouth daily as needed for Anxiety. Yes Provider, MD Yury   rosuvastatin (CRESTOR) 5 MG tablet Take 1 tablet by mouth daily   Yes Provider, MD Yury   insulin regular human (HUMULIN R U-500 KWIKPEN) 500 UNIT/ML SOPN concentrated injection pen Inject 130 Units into the skin 2 times daily (with meals)   Yes Provider, MD Yury   metoprolol tartrate (LOPRESSOR) 50 MG tablet Take 1 tablet by mouth 2 times daily  Patient not taking: Reported on 11/27/2023 11/6/23   Maria M Meek MD        Follow up if symptoms persist or if symptoms worsen. I ADVISED PATIENT TO GO TO ER IF SYMPTOMS WORSEN , CHANGE OR FAILS TO IMPROVE. I have discussed the diagnosis with the patient and the intended plan as seen in the above orders. The patient has received an after-visit summary and questions were answered concerning future plans. I have discussed medication side effects and warnings with the patient as well. The patient agrees and understands above plan. An electronic signature was used to authenticate this note.     EVELIN Bhatti - OMID

## 2023-11-30 LAB
BACTERIA SPEC CULT: ABNORMAL
CC UR VC: ABNORMAL
SERVICE CMNT-IMP: ABNORMAL

## 2023-12-05 ENCOUNTER — TELEPHONE (OUTPATIENT)
Age: 43
End: 2023-12-05

## 2023-12-05 NOTE — TELEPHONE ENCOUNTER
Per [place], a referral was received by EVELIN Goddard NP at [facility] to schedule a new patient appt. This writer attempted to contact pt; however, pt did not answer phone. A voice message was left.